# Patient Record
Sex: MALE | Race: WHITE | NOT HISPANIC OR LATINO | Employment: PART TIME | URBAN - METROPOLITAN AREA
[De-identification: names, ages, dates, MRNs, and addresses within clinical notes are randomized per-mention and may not be internally consistent; named-entity substitution may affect disease eponyms.]

---

## 2017-08-04 ENCOUNTER — ALLSCRIPTS OFFICE VISIT (OUTPATIENT)
Dept: OTHER | Facility: OTHER | Age: 27
End: 2017-08-04

## 2017-08-04 DIAGNOSIS — A74.9 CHLAMYDIAL INFECTION: ICD-10-CM

## 2017-08-04 DIAGNOSIS — Z11.3 ENCOUNTER FOR SCREENING FOR INFECTIONS WITH PREDOMINANTLY SEXUAL MODE OF TRANSMISSION: ICD-10-CM

## 2017-08-09 ENCOUNTER — GENERIC CONVERSION - ENCOUNTER (OUTPATIENT)
Dept: OTHER | Facility: OTHER | Age: 27
End: 2017-08-09

## 2017-08-09 ENCOUNTER — LAB CONVERSION - ENCOUNTER (OUTPATIENT)
Dept: OTHER | Facility: OTHER | Age: 27
End: 2017-08-09

## 2017-08-09 LAB
CLINICAL COMMENT (HISTORICAL): NORMAL
HEPATITIS A IGM ANTIBODY (HISTORICAL): NORMAL
HEPATITIS B CORE TOTAL ANTIBODY (HISTORICAL): NORMAL
HEPATITIS B SURFACE ANTIGEN (HISTORICAL): NORMAL
HEPATITIS C ANTIBODY (HISTORICAL): NORMAL
HIV AG/AB, 4TH GEN (HISTORICAL): NORMAL
RPR SCREEN (HISTORICAL): NORMAL
SIGNAL TO CUT-OFF (HISTORICAL): 0.03

## 2017-09-09 ENCOUNTER — ALLSCRIPTS OFFICE VISIT (OUTPATIENT)
Dept: OTHER | Facility: OTHER | Age: 27
End: 2017-09-09

## 2018-01-08 ENCOUNTER — ALLSCRIPTS OFFICE VISIT (OUTPATIENT)
Dept: OTHER | Facility: OTHER | Age: 28
End: 2018-01-08

## 2018-01-09 ENCOUNTER — ALLSCRIPTS OFFICE VISIT (OUTPATIENT)
Dept: OTHER | Facility: OTHER | Age: 28
End: 2018-01-09

## 2018-01-09 NOTE — PROGRESS NOTES
Assessment   1  Genital warts (078 11) (A63 0)    Plan   Genital warts    · *1 -  [facility]  *  Status: Active  Requested for:    [id number]  () Care Summary provided  : Yes    Discussion/Summary      Patient presents for evaluation of genital warts  has history of multiple sexual partners in the past  patient regarding using condoms for STD prevention  to [facility] for evaluation and treatment  is scheduled with Dr Fidela Apley for tomorrow at 2 pm     The patient was counseled regarding risk factor reductions,-- risks and benefits of treatment options  Chief Complaint   Patient presents for a request for a urology referral due to exposure to HPV  History of Present Illness   HPI: Patient presents to the office c/o genital warts  noticed multiple small bumps on his penis a few days ago  states that he started a new relationship with girlfriend 3 months ago  He was not using condoms  has H/o multiple sexual partners in the past     had genital warts treated previously by cryotherapy, was seen by [facility] urology approximately 2 years ago     had Gardasil vaccination in 2011  testicular pain  No urinary symptoms  No penile discharge    had testing done for STDs in August 2017  Ab, Hep B serology Hep C Ab, RPR, test for Chlamydia and Gonorrhea were negative  Review of Systems        Constitutional: no fever,-- no chills-- and-- not feeling tired  ENT: no sore throat-- and-- no nasal discharge  Respiratory: no cough  Gastrointestinal: no abdominal pain,-- no nausea,-- no vomiting,-- no constipation,-- no diarrhea-- and-- no blood in stools  Genitourinary: as noted in HPI  Musculoskeletal: no arthralgias,-- no joint swelling-- and-- no myalgias  Integumentary: genital warts, but-- no rashes,-- no itching-- and-- no skin wound  Neurological: no headache-- and-- no dizziness  Active Problems   1   Asthma (493 90) (J45 909)  2  Chlamydia infection (079 98) (A74 9)  3  Genital warts (078 11) (A63 0)  4  Has multiple sexual partners (V15 89) (Z91 89)  5  Head Injury (959 01)  6  Herpes simplex type 1 infection (054 9) (B00 9)  7  Plantar warts (078 12) (B07 0)  8  Screen for STD (sexually transmitted disease) (V74 5) (Z11 3)  9  Seasonal allergies (477 9) (J30 2)  10  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)    Past Medical History   1  History of Acute bacterial conjunctivitis of left eye (372 03) (H10 32)  2  History of Fracture Of Carpal Bone(S) (814 00)  3  History of Fracture Of The Ankle (824 8)  4  History of Head Injury (959 01)  5  History of Herpes simplex type 1 infection (054 9) (B00 9)  6  History of acne (V13 3) (Z87 2)  7  History of contact dermatitis (V13 3) (Z87 2)  8  History of viral warts (V12 09) (Z86 19)  9  History of Pain in wrist, unspecified laterality (719 43) (M25 539)  10  History of Skull Fracture (803 00)  11  History of Wrist Sprain (842 00)  Active Problems And Past Medical History Reviewed: The active problems and past medical history were reviewed and updated today  Social History    · Alcohol Use (History)   · Being A Social Drinker   · Former smoker (I06 30) (R53 876)   · Has multiple sexual partners (V15 89) (Z91 89)  The social history was reviewed and updated today  Surgical History   1  History of Open Treat Trimalleolar Ankle Fracture, Fixation Post Lip  2  History of Repair Of Superficial Wound Scalp  3  History of Skull Repair  4  History of Wrist Surgery  Surgical History Reviewed: The surgical history was reviewed and updated today  Current Meds   1  Fexofenadine HCl - 180 MG Oral Tablet; TAKE 1 TABLET DAILY; Therapy: 36OVM8871 to (Evaluate:08Mar2018); Last Rx:21Tly4806 Ordered  2  ProAir  (90 Base) MCG/ACT Inhalation Aerosol Solution; inhale 2 puffs every 4     hours as needed for cough and wheeze;      Therapy: 09AAS5667 to (Evaluate:93Swu5313)  Requested for: 01Rac6573; Last     Rx:26Apr2017 Ordered  3  Tobramycin 0 3 % Ophthalmic Solution; use 2 drops in L eye 4 times daily for 3-5 days; Therapy: 96HIH5308 to (Last Rx:92Evf6274)  Requested for: 09Sep2017 Ordered     The medication list was reviewed and updated today  Allergies   1  No Known Drug Allergies    Vitals    Recorded: 14ICS1365 03:20PM   Temperature 96 8 F, Tympanic   Heart Rate 68, L Radial   Pulse Quality Normal, L Radial   Respiration Quality Normal   Respiration 16   Systolic 067, LUE, Sitting   Diastolic 72, LUE, Sitting   Height 5 ft 8 in   Weight 188 lb 8 oz   BMI Calculated 28 66   BSA Calculated 1 99   O2 Saturation 97   Pain Scale 0     Physical Exam        Constitutional      General appearance: No acute distress, well appearing and well nourished  Eyes      Conjunctiva and lids: No swelling, erythema, or discharge  Pupils and irises: Equal, round and reactive to light  Ears, Nose, Mouth, and Throat      External inspection of ears and nose: Normal        Otoscopic examination: Tympanic membrance translucent with normal light reflex  Canals patent without erythema  Oropharynx: Normal with no erythema, edema, exudate or lesions  Pulmonary      Respiratory effort: No increased work of breathing or signs of respiratory distress  Auscultation of lungs: Clear to auscultation, equal breath sounds bilaterally, no wheezes, no rales, no rhonci  Cardiovascular      Auscultation of heart: Normal rate and rhythm, normal S1 and S2, without murmurs  Examination of extremities for edema and/or varicosities: Normal        Abdomen      Abdomen: Non-tender, no masses  Liver and spleen: No hepatomegaly or splenomegaly  Musculoskeletal      Gait and station: Normal        Digits and nails: Normal without clubbing or cyanosis  Inspection/palpation of joints, bones, and muscles: Normal        Skin No rashes  Additional Exam:   exam: no testicular swelling  No penile discharge  Multiple small genital warts on penis           Signatures    Electronically signed by : BOO Burk ; Jan 8 2018  4:07PM EST                       (Author)

## 2018-01-10 NOTE — CONSULTS
Assessment   1  Genital warts (078 11) (A63 0)    Discussion/Summary   Discussion Summary:    We discussed his situation  He I recommend trial of topical treatment or fulguration with laser  He is concerned about the cost and would like to consider ambulatory fulguration in the office  Unfortunately told him this is not offered  He options will include topical treatment or hospital fulguration  I did reach out to Dr Andrew Robledo to see if his office is performing this any longer and he said that they are not  Patient's Capacity to Self-Care: Patient is able to Self-Care  Medication SE Review and Pt Understands Tx: The treatment plan was reviewed with the patient/guardian  The patient/guardian understands and agrees with the treatment plan    Self Referrals:    Self Referrals: No Dr Serenity Lujan      Chief Complaint   Chief Complaint Free Text Note Form: patient presents with genital warts      History of Present Illness   HPI: 57-year-old male presents complaining of genital warts  He has history of HPV vaccine in 2011  However in 2014 he developed genital condyloma after a new sexual relationship  He saw Dr Bard Santiago in [facility] and had laser and surgical removal of numerous lesions in the operating room at that time  He had previously tried topical therapy which was unsuccessful  He has done well until recently  He had a new sexual partner and developed new lesions  He reports multiple lesions on the shaft of the penis and the suprapubic area  Review of Systems   Complete-Male Urology:      Constitutional: No fever or chills, feels well, no tiredness, no recent weight gain or weight loss  Respiratory: No complaints of shortness of breath, no wheezing, no cough, no SOB on exertion, no orthopnea or PND  Cardiovascular: No complaints of slow heart rate, no fast heart rate, no chest pain, no palpitations, no leg claudication, no lower extremity        Gastrointestinal: No complaints of abdominal pain, no constipation, no nausea or vomiting, no diarrhea or bloody stools  Genitourinary: Empty sensation-- and-- stream quality good, but-- as noted in HPI,-- no dysuria,-- no urinary hesitancy,-- no hematuria,-- no incontinence,-- no nocturia-- and-- no feelings of urinary urgency  Musculoskeletal: No complaints of arthralgia, no myalgias, no joint swelling or stiffness, no limb pain or swelling  Integumentary: No complaints of skin rash or skin lesions, no itching, no skin wound, no dry skin  Hematologic/Lymphatic: No complaints of swollen glands, no swollen glands in the neck, does not bleed easily, no easy bruising  Neurological: No compliants of headache, no confusion, no convulsions, no numbness or tingling, no dizziness or fainting, no limb weakness, no difficulty walking  ROS Reviewed:    ROS reviewed  Active Problems   1  Asthma (493 90) (J45 909)   2  Chlamydia infection (079 98) (A74 9)   3  Genital warts (078 11) (A63 0)   4  Has multiple sexual partners (V15 89) (Z91 89)   5  Head Injury (959 01)   6  Herpes simplex type 1 infection (054 9) (B00 9)   7  Plantar warts (078 12) (B07 0)   8  Screen for STD (sexually transmitted disease) (V74 5) (Z11 3)   9  Seasonal allergies (477 9) (J30 2)   10  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)    Past Medical History   1  History of Acute bacterial conjunctivitis of left eye (372 03) (H10 32)   2  History of Fracture Of Carpal Bone(S) (814 00)   3  History of Fracture Of The Ankle (824 8)   4  History of Head Injury (959 01)   5  History of Herpes simplex type 1 infection (054 9) (B00 9)   6  History of acne (V13 3) (Z87 2)   7  History of contact dermatitis (V13 3) (Z87 2)   8  History of viral warts (V12 09) (Z86 19)   9  History of Pain in wrist, unspecified laterality (719 43) (M25 539)   10  History of Skull Fracture (803 00)   11   History of Wrist Sprain (842 00)  Active Problems And Past Medical History Reviewed: The active problems and past medical history were reviewed and updated today  Surgical History   1  History of Open Treat Trimalleolar Ankle Fracture, Fixation Post Lip   2  History of Repair Of Superficial Wound Scalp   3  History of Skull Repair   4  History of Wrist Surgery  Surgical History Reviewed: The surgical history was reviewed and updated today  Family History   Father    1  Family history of malignant neoplasm of prostate (V16 42) (Z80 45)  Family History Reviewed: The family history was reviewed and updated today  Social History    · Alcohol Use (History)   · Being A Social Drinker   · Former smoker (X74 42) (S53 120)   · Has multiple sexual partners (V15 89) (Z91 89)  Social History Reviewed: The social history was reviewed and updated today  Current Meds    1  ProAir  (90 Base) MCG/ACT Inhalation Aerosol Solution; inhale 2 puffs every 4     hours as needed for cough and wheeze; Therapy: 11NUE3138 to (Evaluate:00Agb7656)  Requested for: 45Jqn9552; Last     Rx:26Apr2017 Ordered  Medication List Reviewed: The medication list was reviewed and updated today  Allergies   1  No Known Drug Allergies  2  Animal dander - Cats    Vitals   Vital Signs    Recorded: 80AXG1380 02:18PM   Heart Rate 74   Systolic 410   Diastolic 62   Height 5 ft 9 in   Weight 186 lb    BMI Calculated 27 47   BSA Calculated 2     Physical Exam        Constitutional      General appearance: No acute distress, well appearing and well nourished  Pulmonary      Respiratory effort: No increased work of breathing or signs of respiratory distress  Cardiovascular      Examination of extremities for edema and/or varicosities: Normal        Abdomen      Abdomen: Non-tender, no masses  Genitourinary      Scrotum contents: Normal size, no masses  Testes: Normal testes, no masses         Penis: Abnormal  -- At least 7 small 1 to 2 mm lesions on the penile shaft and suprapubic area  No scrotal lesions  Musculoskeletal      Gait and station: Normal        Skin      Skin and subcutaneous tissue: Normal without rashes or lesions  Lymphatic      Palpation of lymph nodes in groin: Normal        Neurologic      Sensation: No sensory loss         Signatures    Electronically signed by : BOO Mccullough ; Jan 9 2018  3:28PM EST                       (Author)

## 2018-01-11 NOTE — RESULT NOTES
Verified Results  (1) HIV AG/AB 4TH Lucho GEN 44Svj9519 11:23AM Ora Muñoz     Test Name Result Flag Reference   HIV AG/AB, 4TH GEN NON-REACTIVE  NON-REACTIVE   HIV-1 antigen and HIV-1/HIV-2 antibodies were not  detected  There is no laboratory evidence of HIV  infection  PLEASE NOTE: This information has been disclosed to  you from records whose confidentiality may be  protected by state law  If your state requires such  protection, then the state law prohibits you from  making any further disclosure of the information  without the specific written consent of the person  to whom it pertains, or as otherwise permitted by law  A general authorization for the release of medical or  other information is NOT sufficient for this purpose  For additional information please refer to  [email or web address]/faq/ISG582  (This link is being provided for informational/  educational purposes only )        The performance of this assay has not been clinically  validated in patients less than 3years old

## 2018-01-13 VITALS
HEART RATE: 78 BPM | HEIGHT: 68 IN | BODY MASS INDEX: 27.01 KG/M2 | SYSTOLIC BLOOD PRESSURE: 116 MMHG | WEIGHT: 178.25 LBS | DIASTOLIC BLOOD PRESSURE: 74 MMHG | TEMPERATURE: 97.7 F | RESPIRATION RATE: 16 BRPM

## 2018-01-13 VITALS
RESPIRATION RATE: 16 BRPM | SYSTOLIC BLOOD PRESSURE: 100 MMHG | BODY MASS INDEX: 28.01 KG/M2 | DIASTOLIC BLOOD PRESSURE: 60 MMHG | HEART RATE: 60 BPM | WEIGHT: 184.8 LBS | TEMPERATURE: 98.6 F | HEIGHT: 68 IN

## 2018-01-22 VITALS
WEIGHT: 186 LBS | BODY MASS INDEX: 27.55 KG/M2 | HEART RATE: 74 BPM | DIASTOLIC BLOOD PRESSURE: 62 MMHG | HEIGHT: 69 IN | SYSTOLIC BLOOD PRESSURE: 118 MMHG

## 2018-01-23 VITALS
HEART RATE: 68 BPM | BODY MASS INDEX: 28.57 KG/M2 | DIASTOLIC BLOOD PRESSURE: 72 MMHG | SYSTOLIC BLOOD PRESSURE: 120 MMHG | HEIGHT: 68 IN | OXYGEN SATURATION: 97 % | TEMPERATURE: 96.8 F | RESPIRATION RATE: 16 BRPM | WEIGHT: 188.5 LBS

## 2018-02-01 ENCOUNTER — TELEPHONE (OUTPATIENT)
Dept: UROLOGY | Facility: AMBULATORY SURGERY CENTER | Age: 28
End: 2018-02-01

## 2018-02-01 ENCOUNTER — OFFICE VISIT (OUTPATIENT)
Dept: UROLOGY | Facility: MEDICAL CENTER | Age: 28
End: 2018-02-01
Payer: COMMERCIAL

## 2018-02-01 VITALS
SYSTOLIC BLOOD PRESSURE: 120 MMHG | WEIGHT: 178 LBS | DIASTOLIC BLOOD PRESSURE: 80 MMHG | HEIGHT: 69 IN | BODY MASS INDEX: 26.36 KG/M2

## 2018-02-01 DIAGNOSIS — A63.0 CONDYLOMA: Primary | ICD-10-CM

## 2018-02-01 PROCEDURE — 99214 OFFICE O/P EST MOD 30 MIN: CPT | Performed by: NURSE PRACTITIONER

## 2018-02-01 NOTE — PROGRESS NOTES
I have reviewed the notes, assessments, and/or procedures performed by WOJCIECH, I concur with her/his documentation of Cony Rudd

## 2018-02-01 NOTE — PATIENT INSTRUCTIONS
Keep penis clean and dry, stop vinegar use  Neosporin to burns  lotrisone to inner thighs and scrotum for itching and rash  Fu 2 wk with Dr Maurilio Orellana for treatment on remaining condyloma lesions

## 2018-02-01 NOTE — PROGRESS NOTES
Assessment/Plan:    No problem-specific Assessment & Plan notes found for this encounter  Condyloma: keep penis clean and dry, stop vinegar treatment(caused burns due                       To excessive use ) Neosporin ok to help heal burns  Use lotrisone cream to inner thigh and scrotum tid                        FU 2 wks to treat any remaining warts         Subjective:      Patient ID: Ana Laura Mcdonnell is a 32 y o  male managed by Dr Nicki Urbina  Treated  Condyloma in past by "burning in office" was clear for a long time   Now noticed a number  Of lesions on both sides of phallus  Started apple cider vinegar applying  on lesions and drinking it as well  A few of the lesions disappeared but some did not  Now areas of inflammation all over genital area with  extreme itching  [facility] HPI: above    The following portions of the patient's history were reviewed and updated as appropriate: allergies, current medications, past family history, past medical history, past social history, past surgical history and problem list     Review of Systems    Review of Systems - History obtained from chart review and the patient  General ROS: negative  Respiratory ROS: no cough, shortness of breath, or wheezing  Gastrointestinal ROS: no abdominal pain, change in bowel habits, or black or bloody stools  Genito-Urinary ROS: positive for - genital ulcers and itching  Musculoskeletal ROS: negative    Objective:     Physical Exam    Constitutional: alert and oriented  Resp: nl  Abdominal: soft, non tender  Gu: multiple lesions on both sides of penis, rash on inner thighs and scrotum, scabbing  On left shaft  Groin adenopathy due to inflammation    Musculo: nl

## 2018-02-01 NOTE — PROGRESS NOTES
Patient here for warts  Patient dx with HPV in the past and had seen Dr Shanice Romero  Pt had cryo done  Patient tried "apple cider vinegar" to try to help  Patient c/o itching

## 2018-02-15 ENCOUNTER — OFFICE VISIT (OUTPATIENT)
Dept: UROLOGY | Facility: MEDICAL CENTER | Age: 28
End: 2018-02-15
Payer: COMMERCIAL

## 2018-02-15 VITALS
DIASTOLIC BLOOD PRESSURE: 72 MMHG | BODY MASS INDEX: 26.66 KG/M2 | HEIGHT: 69 IN | SYSTOLIC BLOOD PRESSURE: 114 MMHG | WEIGHT: 180 LBS

## 2018-02-15 DIAGNOSIS — A63.0 CONDYLOMA: Primary | ICD-10-CM

## 2018-02-15 LAB
SL AMB  POCT GLUCOSE, UA: NEGATIVE
SL AMB LEUKOCYTE ESTERASE,UA: NEGATIVE
SL AMB POCT BILIRUBIN,UA: NEGATIVE
SL AMB POCT BLOOD,UA: NEGATIVE
SL AMB POCT CLARITY,UA: CLEAR
SL AMB POCT COLOR,UA: YELLOW
SL AMB POCT KETONES,UA: NEGATIVE
SL AMB POCT NITRITE,UA: NEGATIVE
SL AMB POCT PH,UA: 7
SL AMB POCT SPECIFIC GRAVITY,UA: 1.02
SL AMB POCT URINE PROTEIN: NORMAL
SL AMB POCT UROBILINOGEN: 0.2

## 2018-02-15 PROCEDURE — 54065 DESTRUCTION PENIS LESION(S): CPT | Performed by: UROLOGY

## 2018-02-15 PROCEDURE — 81003 URINALYSIS AUTO W/O SCOPE: CPT | Performed by: UROLOGY

## 2018-02-15 NOTE — LETTER
February 15, 2018     Jared Section, [address]    Patient: Grace Leung   YOB: 1990   Date of Visit: 2/15/2018       Dear Dr Serenity Lujan:    Thank you for referring Giovanna Oliva to me for evaluation  Below are my notes for this consultation  If you have questions, please do not hesitate to call me  I look forward to following your patient along with you  Sincerely,        Nba Dacosta MD        CC: No Recipients  Nba Dacosta MD  2/15/2018 10:40 AM  Sign at close encounter  Procedures     The patient returns for fulguration of recurrent anuria worked  Careful examination of the penis disclosed 10 works, all about 1 mm in diameter scattered on the penis and the pre pubic area  The underlying skin was infiltrated with 1% xylocaine  Using the needle-tip cautery, the warts were vaporized  Antibiotic ointment was applied  The patient tolerated the procedure well and was discharged home

## 2018-02-15 NOTE — PROGRESS NOTES
Procedures     The patient returns for fulguration of recurrent anuria worked  Careful examination of the penis disclosed 10 works, all about 1 mm in diameter scattered on the penis and the pre pubic area  The underlying skin was infiltrated with 1% xylocaine  Using the needle-tip cautery, the warts were vaporized  Antibiotic ointment was applied  The patient tolerated the procedure well and was discharged home

## 2018-02-27 ENCOUNTER — OFFICE VISIT (OUTPATIENT)
Dept: UROLOGY | Facility: MEDICAL CENTER | Age: 28
End: 2018-02-27
Payer: COMMERCIAL

## 2018-02-27 VITALS
SYSTOLIC BLOOD PRESSURE: 120 MMHG | BODY MASS INDEX: 26.81 KG/M2 | WEIGHT: 181 LBS | HEIGHT: 69 IN | DIASTOLIC BLOOD PRESSURE: 80 MMHG

## 2018-02-27 DIAGNOSIS — L98.9 BENIGN SKIN LESION: Primary | ICD-10-CM

## 2018-02-27 PROCEDURE — 54055 DESTRUCTION PENIS LESION(S): CPT | Performed by: UROLOGY

## 2018-02-27 NOTE — LETTER
February 27, 2018     Onelia Hickey[address]    Patient: Baltazar Perez   YOB: 1990   Date of Visit: 2/27/2018       Dear Dr Ricci Hand:    Thank you for referring Santiago Ibarra to me for evaluation  Below are my notes for this consultation  If you have questions, please do not hesitate to call me  I look forward to following your patient along with you  Sincerely,        King Chamorro MD        CC: No Recipients  King Chamorro MD  2/27/2018 11:30 AM  Sign at close encounter  Procedures  The patient returns for evaluation of for new 1 mm skin lesions on the shaft of his penis which he believes may be tiny warts  These were examined with a magnifying glass and are probably not warts however the patient is very troubled by this  They certainly could emerged become full grown warts and for this reason they were fulgurated today  After using local anesthesia to anesthetize the 4 sites in, the needle tip cautery was used to destroy these four 1 mm lesions  Antibiotic ointment was applied  The patient tolerated the procedure well  He will return p adarsh Lockhart Cassette

## 2018-02-27 NOTE — PATIENT INSTRUCTIONS
Genital Warts   WHAT YOU NEED TO KNOW:   Genital warts are a sexually transmitted infection (STI) caused by the human papillomavirus (HPV)  Genital warts are growths that appear in or on the penis, vagina, or anus  Genital warts are spread during genital, anal, or oral sex  A woman can also pass them to a baby when she gives birth  DISCHARGE INSTRUCTIONS:   Contact your healthcare provider if:   · Your genital warts return  · The skin that is being treated for genital warts is very painful or swollen  · You see or feel new warts on another part of your body  · You have questions or concerns about your condition or care  Medicines:   · Immunomodulators  help strengthen your immune system and treat genital warts  · Antiproliferatives  help stop genital warts from growing in size or increasing in number  · Antivirals  help control and stop virus growth, such as HPV  · Take your medicine as directed  Contact your healthcare provider if you think your medicine is not helping or if you have side effects  Tell him or her if you are allergic to any medicine  Keep a list of the medicines, vitamins, and herbs you take  Include the amounts, and when and why you take them  Bring the list or the pill bottles to follow-up visits  Carry your medicine list with you in case of an emergency  Self-care:   · Do not touch or scratch the warts  This can cause the infection to spread to other parts of your body  · Do not have sex while you are being treated for genital warts  Medicine used on your skin weakens condoms and diaphragms  You also risk spreading genital warts to your partner  · Get regular Pap smears  If you are a woman, this can help diagnose HPV and prevent the spread of the virus  Prevent genital warts:   · Tell your sexual partners that you are being treated for genital warts  They may also be infected and need treatment  · Get the HPV vaccine    The HPV vaccine is given at 9 to 26 years of age to help prevent cervical cancer and genital warts  Ask your healthcare provider for more information about this vaccine  Follow up with your healthcare provider as directed:  Write down your questions so you remember to ask them during your visits  © 2017 [employer] Information is for End User's use only and may not be sold, redistributed or otherwise used for commercial purposes  All illustrations and images included in CareNotes® are the copyrighted property of A D A M , Inc  or [employer]  The above information is an  only  It is not intended as medical advice for individual conditions or treatments  Talk to your doctor, nurse or pharmacist before following any medical regimen to see if it is safe and effective for you

## 2018-02-27 NOTE — PROGRESS NOTES
Procedures  The patient returns for evaluation of for new 1 mm skin lesions on the shaft of his penis which he believes may be tiny warts  These were examined with a magnifying glass and are probably not warts however the patient is very troubled by this  They certainly could emerged become full grown warts and for this reason they were fulgurated today  After using local anesthesia to anesthetize the 4 sites in, the needle tip cautery was used to destroy these four 1 mm lesions  Antibiotic ointment was applied  The patient tolerated the procedure well  He will return p r n Karrie Nissen

## 2021-07-16 ENCOUNTER — OFFICE VISIT (OUTPATIENT)
Dept: FAMILY MEDICINE CLINIC | Facility: CLINIC | Age: 31
End: 2021-07-16
Payer: COMMERCIAL

## 2021-07-16 VITALS
SYSTOLIC BLOOD PRESSURE: 110 MMHG | OXYGEN SATURATION: 97 % | WEIGHT: 184.8 LBS | HEART RATE: 64 BPM | DIASTOLIC BLOOD PRESSURE: 70 MMHG | TEMPERATURE: 97.9 F | HEIGHT: 69 IN | BODY MASS INDEX: 27.37 KG/M2

## 2021-07-16 DIAGNOSIS — J45.20 MILD INTERMITTENT REACTIVE AIRWAY DISEASE WITHOUT COMPLICATION: Primary | ICD-10-CM

## 2021-07-16 PROCEDURE — 1036F TOBACCO NON-USER: CPT | Performed by: FAMILY MEDICINE

## 2021-07-16 PROCEDURE — 3725F SCREEN DEPRESSION PERFORMED: CPT | Performed by: FAMILY MEDICINE

## 2021-07-16 PROCEDURE — 3008F BODY MASS INDEX DOCD: CPT | Performed by: FAMILY MEDICINE

## 2021-07-16 PROCEDURE — 99203 OFFICE O/P NEW LOW 30 MIN: CPT | Performed by: FAMILY MEDICINE

## 2021-07-16 RX ORDER — ALBUTEROL SULFATE 90 UG/1
2 AEROSOL, METERED RESPIRATORY (INHALATION) EVERY 4 HOURS PRN
Qty: 18 G | Refills: 1 | Status: SHIPPED | OUTPATIENT
Start: 2021-07-16 | End: 2022-02-28 | Stop reason: SDUPTHER

## 2021-07-16 NOTE — PROGRESS NOTES
Chief Complaint   Patient presents with   [address]     new patient, would like refill for Albuterol, would like to continue working from home, needs letter to continue working from [employer]       Assessment/Plan:  Will give a note to work from home for 2 months  For an extended time working from home Pulmonary evaluation requested  Referral given  PHQ-9 Depression Screening    PHQ-9:   Frequency of the following problems over the past two weeks:      Little interest or pleasure in doing things: 0 - not at all  Feeling down, depressed, or hopeless: 0 - not at all  PHQ-2 Score: 0       BMI Counseling: Body mass index is 27 29 kg/m²  The BMI is above normal  Nutrition recommendations include increasing intake of lean protein  Diagnoses and all orders for this visit:    Mild intermittent reactive airway disease without complication  -     albuterol (ProAir HFA) 90 mcg/act inhaler; Inhale 2 puffs every 4 (four) hours as needed (as needed)  -     Ambulatory referral to Pulmonology; Future          Subjective:      Patient ID: Dinh Mcknight is a 27 y o  male  First visit  Rupali Stein is requesting a note to continue working from home till November  Patient states he has a history of Asthma, treats with albuterol hand held inhaler  Had covid past January, treated with present inhaler - no hospital admission  The following portions of the patient's history were reviewed and updated as appropriate: allergies, current medications, past family history, past medical history, past social history, past surgical history and problem list     Review of Systems   Constitutional: Negative  HENT: Negative  Eyes: Negative  Respiratory: Negative  Cardiovascular: Negative  Gastrointestinal: Negative  Genitourinary: Negative  Musculoskeletal: Negative  Skin: Negative  Neurological: Negative  Psychiatric/Behavioral: Negative            Objective:      /70 (BP Location: Left arm) Pulse 64   Temp 97 9 °F (36 6 °C) (Temporal)   Ht 5' 9" (1 753 m)   Wt 83 8 kg (184 lb 12 8 oz)   SpO2 97%   BMI 27 29 kg/m²       Current Outpatient Medications:     albuterol (ProAir HFA) 90 mcg/act inhaler, Inhale 2 puffs every 4 (four) hours as needed (as needed), Disp: 18 g, Rfl: 1    fexofenadine (ALLEGRA) 180 MG tablet, Take 1 tablet by mouth daily (Patient not taking: Reported on 7/16/2021), Disp: , Rfl:     tobramycin (TOBREX) 0 3 % SOLN, Apply to eye (Patient not taking: Reported on 7/16/2021), Disp: , Rfl:      Allergies   Allergen Reactions    Other      Gerbils  Cats        Physical Exam  Constitutional:       Appearance: He is well-developed  HENT:      Head: Normocephalic and atraumatic  Right Ear: External ear normal       Left Ear: External ear normal       Nose: Nose normal       Mouth/Throat:      Mouth: Mucous membranes are moist       Pharynx: Oropharynx is clear  Eyes:      Conjunctiva/sclera: Conjunctivae normal       Pupils: Pupils are equal, round, and reactive to light  Cardiovascular:      Rate and Rhythm: Normal rate and regular rhythm  Heart sounds: Normal heart sounds  Pulmonary:      Effort: Pulmonary effort is normal  No respiratory distress  Breath sounds: Normal breath sounds  No wheezing  Musculoskeletal:      Cervical back: Normal range of motion and neck supple  Skin:     General: Skin is warm and dry  Neurological:      General: No focal deficit present  Mental Status: He is alert and oriented to person, place, and time  Deep Tendon Reflexes: Reflexes are normal and symmetric  Psychiatric:         Mood and Affect: Mood normal          Behavior: Behavior normal          Thought Content:  Thought content normal          Judgment: Judgment normal

## 2022-02-28 ENCOUNTER — OFFICE VISIT (OUTPATIENT)
Dept: FAMILY MEDICINE CLINIC | Facility: CLINIC | Age: 32
End: 2022-02-28
Payer: COMMERCIAL

## 2022-02-28 VITALS
BODY MASS INDEX: 28.14 KG/M2 | WEIGHT: 190 LBS | HEART RATE: 97 BPM | SYSTOLIC BLOOD PRESSURE: 90 MMHG | HEIGHT: 69 IN | DIASTOLIC BLOOD PRESSURE: 60 MMHG | OXYGEN SATURATION: 98 % | TEMPERATURE: 97.9 F

## 2022-02-28 DIAGNOSIS — J45.20 MILD INTERMITTENT REACTIVE AIRWAY DISEASE WITHOUT COMPLICATION: ICD-10-CM

## 2022-02-28 PROCEDURE — 99214 OFFICE O/P EST MOD 30 MIN: CPT | Performed by: FAMILY MEDICINE

## 2022-02-28 RX ORDER — ALBUTEROL SULFATE 90 UG/1
2 AEROSOL, METERED RESPIRATORY (INHALATION) EVERY 4 HOURS PRN
Qty: 18 G | Refills: 2 | Status: SHIPPED | OUTPATIENT
Start: 2022-02-28

## 2022-02-28 NOTE — LETTER
February 28, 2022     Patient: Dottie Mccall   YOB: 1990   Date of Visit: 2/28/2022       To Whom it May Concern:    Jennifer Shoemaker is under my professional care  He was seen in my office on 2/28/2022  Patient should continue working from home for another two months due to Asthma  The date to return to work in person will be 04/28/2022 but will be evaluated by pulmonary specialist who will determine any further extensions  If you have any questions or concerns, please don't hesitate to call           Sincerely,            Janel Marin, DO

## 2022-02-28 NOTE — PROGRESS NOTES
Chief Complaint   Patient presents with    Medication Refill     Talk about returning to work and medication refills     Assessment/Plan:  Note for work, Pulmonary evaluation  BMI Counseling: Body mass index is 28 06 kg/m²  The BMI is above normal  Nutrition recommendations include reducing portion sizes, consuming healthier snacks, moderation in carbohydrate intake and increasing intake of lean protein  PHQ-2/9 Depression Screening    Little interest or pleasure in doing things: 0 - not at all  Feeling down, depressed, or hopeless: 0 - not at all  PHQ-2 Score: 0  PHQ-2 Interpretation: Negative depression screen          Diagnoses and all orders for this visit:    Mild intermittent reactive airway disease without complication  -     albuterol (ProAir HFA) 90 mcg/act inhaler; Inhale 2 puffs every 4 (four) hours as needed (as needed)  -     Ambulatory Referral to Pulmonology; Future          Subjective:      Patient ID: Cony Rudd is a 32 y o  male  Refill hand held inhaler  Uses albuterol with cold air  Concerned about catching covid  Pt discuss RTW with having RAD  Past July gave note for 2 months and need of extended leave Pulmonary evaluation needed to document severity of RAD  The following portions of the patient's history were reviewed and updated as appropriate: allergies, current medications, past family history, past medical history, past social history, past surgical history and problem list   I have spent 25 minutes with Patient  today in which greater than 50% of this time was spent in counseling/coordination of care regarding Diagnostic results, Risks and benefits of tx options, Intructions for management, Patient and family education, Importance of tx compliance, Risk factor reductions and Impressions  Review of Systems   Constitutional: Negative  HENT: Negative  Eyes: Negative  Respiratory: Negative  Cardiovascular: Negative  Gastrointestinal: Negative  Genitourinary: Negative  Musculoskeletal: Negative  Skin: Negative  Neurological: Negative  Psychiatric/Behavioral: Negative  Objective:      BP 90/60 (BP Location: Left arm, Patient Position: Sitting, Cuff Size: Large)   Pulse 97   Temp 97 9 °F (36 6 °C) (Temporal)   Ht 5' 9" (1 753 m)   Wt 86 2 kg (190 lb)   SpO2 98%   BMI 28 06 kg/m²       Current Outpatient Medications:     albuterol (ProAir HFA) 90 mcg/act inhaler, Inhale 2 puffs every 4 (four) hours as needed (as needed), Disp: 18 g, Rfl: 1    fexofenadine (ALLEGRA) 180 MG tablet, Take 1 tablet by mouth daily (Patient not taking: Reported on 7/16/2021), Disp: , Rfl:     tobramycin (TOBREX) 0 3 % SOLN, Apply to eye (Patient not taking: Reported on 7/16/2021), Disp: , Rfl:   Allergies   Allergen Reactions    Other      Gerbils  Cats     Past Surgical History:   Procedure Laterality Date    ANKLE SURGERY Left 2003    WRIST SURGERY Right 2009          Physical Exam  Constitutional:       Appearance: Normal appearance  He is well-developed  HENT:      Head: Normocephalic and atraumatic  Right Ear: External ear normal       Left Ear: External ear normal       Nose: Nose normal       Mouth/Throat:      Mouth: Mucous membranes are moist       Pharynx: Oropharynx is clear  Eyes:      Conjunctiva/sclera: Conjunctivae normal       Pupils: Pupils are equal, round, and reactive to light  Cardiovascular:      Rate and Rhythm: Normal rate and regular rhythm  Heart sounds: Normal heart sounds  Pulmonary:      Effort: Pulmonary effort is normal       Breath sounds: Normal breath sounds  No wheezing  Musculoskeletal:      Cervical back: Normal range of motion and neck supple  Skin:     General: Skin is warm and dry  Neurological:      Mental Status: He is alert and oriented to person, place, and time  Deep Tendon Reflexes: Reflexes are normal and symmetric     Psychiatric:         Mood and Affect: Mood normal  Behavior: Behavior normal          Thought Content:  Thought content normal          Judgment: Judgment normal

## 2023-01-11 DIAGNOSIS — J45.20 MILD INTERMITTENT REACTIVE AIRWAY DISEASE WITHOUT COMPLICATION: ICD-10-CM

## 2023-01-11 RX ORDER — ALBUTEROL SULFATE 90 UG/1
2 AEROSOL, METERED RESPIRATORY (INHALATION) EVERY 4 HOURS PRN
Qty: 18 G | Refills: 2 | Status: SHIPPED | OUTPATIENT
Start: 2023-01-11

## 2023-01-11 RX ORDER — ALBUTEROL SULFATE 90 UG/1
2 AEROSOL, METERED RESPIRATORY (INHALATION) EVERY 4 HOURS PRN
Refills: 2 | OUTPATIENT
Start: 2023-01-11

## 2023-05-26 DIAGNOSIS — J45.20 MILD INTERMITTENT REACTIVE AIRWAY DISEASE WITHOUT COMPLICATION: ICD-10-CM

## 2023-05-26 RX ORDER — ALBUTEROL SULFATE 90 UG/1
2 AEROSOL, METERED RESPIRATORY (INHALATION) EVERY 4 HOURS PRN
Qty: 18 G | Refills: 0 | Status: SHIPPED | OUTPATIENT
Start: 2023-05-26 | End: 2023-06-25

## 2023-05-26 NOTE — TELEPHONE ENCOUNTER
Patient called to request refill of Albuterol inhaler to be sent to [facility] pharmacy  He was told it was denied this week because his last visit was 02/28/2022 and he should be seen every 6 months since he has a chronic conditions  Patient stated he understood but will have to review his calendar and call back to schedule  He was told we will send in one inhaler to the pharmacy but should have an appointment before he runs out, he stated he understood

## 2023-07-03 ENCOUNTER — TELEPHONE (OUTPATIENT)
Dept: FAMILY MEDICINE CLINIC | Facility: CLINIC | Age: 33
End: 2023-07-03

## 2024-01-23 ENCOUNTER — OFFICE VISIT (OUTPATIENT)
Dept: FAMILY MEDICINE CLINIC | Facility: CLINIC | Age: 34
End: 2024-01-23
Payer: COMMERCIAL

## 2024-01-23 VITALS
SYSTOLIC BLOOD PRESSURE: 108 MMHG | DIASTOLIC BLOOD PRESSURE: 74 MMHG | WEIGHT: 182.8 LBS | RESPIRATION RATE: 18 BRPM | HEART RATE: 98 BPM | TEMPERATURE: 98.6 F | BODY MASS INDEX: 28.69 KG/M2 | OXYGEN SATURATION: 97 % | HEIGHT: 67 IN

## 2024-01-23 DIAGNOSIS — J45.20 MILD INTERMITTENT REACTIVE AIRWAY DISEASE WITHOUT COMPLICATION: ICD-10-CM

## 2024-01-23 DIAGNOSIS — Z00.00 ANNUAL PHYSICAL EXAM: Primary | ICD-10-CM

## 2024-01-23 PROCEDURE — 99395 PREV VISIT EST AGE 18-39: CPT | Performed by: FAMILY MEDICINE

## 2024-01-23 RX ORDER — ALBUTEROL SULFATE 90 UG/1
2 AEROSOL, METERED RESPIRATORY (INHALATION) 4 TIMES DAILY
Qty: 18 G | Refills: 5 | Status: SHIPPED | OUTPATIENT
Start: 2024-01-23

## 2024-01-23 RX ORDER — ALBUTEROL SULFATE 90 UG/1
2 AEROSOL, METERED RESPIRATORY (INHALATION) 4 TIMES DAILY
COMMUNITY
Start: 2023-11-13 | End: 2024-01-23 | Stop reason: SDUPTHER
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
## 2024-04-22 DIAGNOSIS — J45.20 MILD INTERMITTENT REACTIVE AIRWAY DISEASE WITHOUT COMPLICATION: ICD-10-CM

## 2024-04-22 RX ORDER — ALBUTEROL SULFATE 90 UG/1
2 AEROSOL, METERED RESPIRATORY (INHALATION) 4 TIMES DAILY
Qty: 6.7 G | Refills: 5 | Status: SHIPPED | OUTPATIENT
Start: 2024-04-22

## 2024-04-22 NOTE — TELEPHONE ENCOUNTER
Medication:  albuterol (PROVENTIL HFA,VENTOLIN HFA) 90 mcg/act inhaler    Dose/Frequency: Inhale 2 puffs 4 (four) times a day     Quantity: 18 g     Pharmacy: [address], [place]    Office:   [x] PCP/Provider - Cm Diaz  [] Speciality/Provider -     Does the patient have enough for 3 days?   [] Yes   [x] No - Send as HP to POD     Patient is out of inhaler at this point

## 2024-04-29 NOTE — TELEPHONE ENCOUNTER
Pt was sick last week and ended up using a majority of his inhaler. Pt tried refilling his inhaler with the pharmacy and was informed that there are no more refills despite chart saying he has 5 refills. Please verify refill number with pharmacy and update pt

## 2024-07-29 ENCOUNTER — OFFICE VISIT (OUTPATIENT)
Dept: FAMILY MEDICINE CLINIC | Facility: CLINIC | Age: 34
End: 2024-07-29
Payer: COMMERCIAL

## 2024-07-29 VITALS
OXYGEN SATURATION: 98 % | HEART RATE: 71 BPM | RESPIRATION RATE: 18 BRPM | TEMPERATURE: 98.2 F | BODY MASS INDEX: 28.66 KG/M2 | DIASTOLIC BLOOD PRESSURE: 82 MMHG | SYSTOLIC BLOOD PRESSURE: 112 MMHG | HEIGHT: 67 IN | WEIGHT: 182.6 LBS

## 2024-07-29 DIAGNOSIS — J45.20 MILD INTERMITTENT REACTIVE AIRWAY DISEASE WITHOUT COMPLICATION: ICD-10-CM

## 2024-07-29 DIAGNOSIS — G40.409 TONIC-CLONIC EPILEPTIC SEIZURES (HCC): Primary | ICD-10-CM

## 2024-07-29 PROCEDURE — 3725F SCREEN DEPRESSION PERFORMED: CPT | Performed by: FAMILY MEDICINE

## 2024-07-29 PROCEDURE — 99213 OFFICE O/P EST LOW 20 MIN: CPT | Performed by: FAMILY MEDICINE

## 2024-07-29 RX ORDER — ALBUTEROL SULFATE 90 UG/1
2 AEROSOL, METERED RESPIRATORY (INHALATION) 4 TIMES DAILY
Qty: 54 G | Refills: 1 | Status: SHIPPED | OUTPATIENT
Start: 2024-07-29

## 2024-07-29 RX ORDER — FLUTICASONE PROPIONATE AND SALMETEROL 250; 50 UG/1; UG/1
1 POWDER RESPIRATORY (INHALATION) 2 TIMES DAILY
Qty: 180 BLISTER | Refills: 1 | Status: SHIPPED | OUTPATIENT
Start: 2024-07-29

## 2024-07-29 RX ORDER — SODIUM FLUORIDE 6 MG/ML
PASTE, DENTIFRICE DENTAL
COMMUNITY
Start: 2024-04-29

## 2024-07-29 NOTE — PROGRESS NOTES
Ambulatory Visit  Name: Ryan Valencia      : 1990      MRN: [id number]  Encounter Provider: Cm Diaz DO  Encounter Date: 2024   Encounter department: [facility]    Assessment & Plan     Add Advair 250-50.        1. Tonic-clonic epileptic seizures (HCC)  2. Mild intermittent reactive airway disease without complication  -     albuterol (PROVENTIL HFA,VENTOLIN HFA) 90 mcg/act inhaler; Inhale 2 puffs 4 (four) times a day  -     Fluticasone-Salmeterol (Advair Diskus) 250-50 mcg/dose inhaler; Inhale 1 puff 2 (two) times a day Rinse mouth after use.       Chief Complaint   Patient presents with    Asthma        History of Present Illness     RAD acting up past 3 months.  Using the albuterol frequently.  This week no problem.      Review of Systems   Constitutional: Negative.    HENT: Negative.     Eyes: Negative.    Respiratory:  Positive for wheezing.    Cardiovascular: Negative.    Gastrointestinal: Negative.    Genitourinary: Negative.    Musculoskeletal: Negative.    Skin: Negative.    Neurological: Negative.    Psychiatric/Behavioral: Negative.       Past Medical History:   Diagnosis Date    Asthma      Past Surgical History:   Procedure Laterality Date    ANKLE SURGERY Left 2003    ELEVATION OF DEPRESSED SKULL FRACTURE Right     WRIST SURGERY Right 2009     Family History   Problem Relation Age of Onset    No Known Problems Mother     Prostate cancer Father      Social History     Tobacco Use    Smoking status: Former     Types: Cigarettes    Smokeless tobacco: Never   Vaping Use    Vaping status: Never Used   Substance and Sexual Activity    Alcohol use: Yes    Drug use: Yes     Types: Marijuana     Comment: not any more    Sexual activity: Not on file     Current Outpatient Medications on File Prior to Visit   Medication Sig    [DISCONTINUED] albuterol (PROVENTIL HFA,VENTOLIN HFA) 90 mcg/act inhaler Inhale 2 puffs 4 (four) times a day    fexofenadine (ALLEGRA) 180 MG tablet Take 1  "tablet by mouth daily (Patient not taking: Reported on 7/29/2024)    Sodium Fluoride 5000 PPM 1.1 % PSTE BRUSH USING A PEA SIZE AMOUNT TWICE DAILY DO NOT EAT RINSE OR SWALLOW (Patient not taking: Reported on 7/29/2024)    tobramycin (TOBREX) 0.3 % SOLN Apply to eye (Patient not taking: Reported on 7/29/2024)     Allergies   Allergen Reactions    Other      Gerbils  Cats     Immunization History   Administered Date(s) Administered    COVID-19 MODERNA VACC 0.5 ML IM 05/02/2021, 05/19/2021, 05/30/2021, 06/16/2021, 01/06/2022    HPV Quadrivalent 03/22/2011, 06/03/2011, 08/12/2011    INFLUENZA 01/06/2022    Meningococcal, Unknown Serogroups 08/12/2011     Objective     /82 (BP Location: Left arm, Patient Position: Sitting, Cuff Size: Large)   Pulse 71   Temp 98.2 °F (36.8 °C) (Oral)   Resp 18   Ht 5' 7\" (1.702 m)   Wt 82.8 kg (182 lb 9.6 oz)   SpO2 98%   BMI 28.60 kg/m²   BMI Counseling: Body mass index is 28.6 kg/m². The BMI is above normal. Nutrition recommendations include reducing portion sizes.  Physical Exam  Constitutional:       Appearance: He is well-developed.   HENT:      Head: Normocephalic and atraumatic.      Right Ear: External ear normal.      Left Ear: External ear normal.      Nose: Nose normal.      Mouth/Throat:      Mouth: Mucous membranes are moist.      Pharynx: Oropharynx is clear.   Eyes:      Conjunctiva/sclera: Conjunctivae normal.      Pupils: Pupils are equal, round, and reactive to light.   Cardiovascular:      Rate and Rhythm: Normal rate and regular rhythm.      Heart sounds: Normal heart sounds.   Pulmonary:      Effort: Pulmonary effort is normal.      Breath sounds: Normal breath sounds. No wheezing.   Musculoskeletal:      Cervical back: Normal range of motion and neck supple.   Skin:     General: Skin is warm and dry.   Neurological:      Mental Status: He is alert and oriented to person, place, and time.      Deep Tendon Reflexes: Reflexes are normal and symmetric. "   Psychiatric:         Mood and Affect: Mood normal.         Behavior: Behavior normal.         Thought Content: Thought content normal.         Judgment: Judgment normal.

## 2025-06-16 ENCOUNTER — OFFICE VISIT (OUTPATIENT)
Dept: FAMILY MEDICINE CLINIC | Facility: CLINIC | Age: 35
End: 2025-06-16

## 2025-06-16 VITALS
HEART RATE: 70 BPM | BODY MASS INDEX: 29.91 KG/M2 | DIASTOLIC BLOOD PRESSURE: 74 MMHG | SYSTOLIC BLOOD PRESSURE: 116 MMHG | HEIGHT: 67 IN | WEIGHT: 190.6 LBS | TEMPERATURE: 98 F | RESPIRATION RATE: 16 BRPM | OXYGEN SATURATION: 96 %
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
## 2025-06-16 PROCEDURE — 99213 OFFICE O/P EST LOW 20 MIN: CPT | Performed by: FAMILY MEDICINE
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
